# Patient Record
Sex: FEMALE | Race: BLACK OR AFRICAN AMERICAN | NOT HISPANIC OR LATINO | URBAN - METROPOLITAN AREA
[De-identification: names, ages, dates, MRNs, and addresses within clinical notes are randomized per-mention and may not be internally consistent; named-entity substitution may affect disease eponyms.]

---

## 2017-04-12 ENCOUNTER — EMERGENCY (EMERGENCY)
Facility: HOSPITAL | Age: 47
LOS: 1 days | Discharge: HOME | End: 2017-04-12
Attending: EMERGENCY MEDICINE | Admitting: EMERGENCY MEDICINE
Payer: MEDICARE

## 2017-04-12 VITALS
DIASTOLIC BLOOD PRESSURE: 98 MMHG | HEART RATE: 99 BPM | SYSTOLIC BLOOD PRESSURE: 168 MMHG | OXYGEN SATURATION: 99 % | RESPIRATION RATE: 20 BRPM | TEMPERATURE: 99 F

## 2017-04-12 VITALS
RESPIRATION RATE: 18 BRPM | TEMPERATURE: 99 F | HEART RATE: 80 BPM | OXYGEN SATURATION: 98 % | DIASTOLIC BLOOD PRESSURE: 70 MMHG | SYSTOLIC BLOOD PRESSURE: 161 MMHG

## 2017-04-12 DIAGNOSIS — R51 HEADACHE: ICD-10-CM

## 2017-04-12 PROCEDURE — 99282 EMERGENCY DEPT VISIT SF MDM: CPT

## 2017-04-12 PROCEDURE — 99283 EMERGENCY DEPT VISIT LOW MDM: CPT | Mod: GC

## 2017-04-12 NOTE — ED PROVIDER NOTE - MEDICAL DECISION MAKING DETAILS
44 yo F p/w right-sided facial pain  -FS 44 yo F p/w right-sided facial pain  -FS  Attending [place]: 47 y/o female presenting with right temporal headache. no focal neurologic deficits on exam to suggest intracranial mass or lesion. no visual deficits. pt asymtomatic currently and feels back to her baseline. less likely temporal arteritis as pt only 46 and currently asymptomatic and well appearing. no pulsatile mass to suggest temporal artery aneurysm. pt observed in the ED without any complaints. will d/c with neuro follow upw

## 2017-04-12 NOTE — ED PROVIDER NOTE - PHYSICAL EXAMINATION
Attending [place]: Gen: NAD, heent: atrauamtic, eomi, perrla, mmm, op pink, uvula midline, TM:;clear neck; nttp, no nuchal rigidity, chest: nttp, no crepitus, cv: rrr, no murmurs, lungs: ctab, abd: soft, nontender, nondistended, no peritoneal signs, +BS, no guarding, ext: wwp, neg homans, skin: no rash, neuro: awake and alert, following commands, speech clear, sensation and strength intact, no focal deficits

## 2017-04-12 NOTE — ED PROVIDER NOTE - CARE PLAN
Principal Discharge DX:	Facial pain  Goal:	Resolution  Instructions for follow-up, activity and diet:	Please follow-up with the Neurology Clinic within 48hrs [phone]  Please take Tylenol for pain.  Please return to the ED if symptoms acutely worsen. Principal Discharge DX:	Facial pain  Goal:	Resolution  Instructions for follow-up, activity and diet:	Please follow-up with the Neurology Clinic within 48hrs [phone]  Please take Tylenol for pain.  Please return to the ED if symptoms acutely worsen.

## 2017-04-12 NOTE — ED PROVIDER NOTE - OBJECTIVE STATEMENT
47 yo F with T2DM on Metformin, HTN -medication non-compliant, Hyperthyroidism -medication non-compliant p/w 1 day history of right facial pain. The pain began this AM and is described as a sharp flash of pain lateral to the eye that occurs for 3-4seconds and occurs every 30-40 minutes. No vision changes, erythema, nasal discharge, recent URI, hx of sinusitis, tenderness to palpation, nausea, vomiting, or aura symptoms. Denies fevers, chills, chest pain, dyspnea/sob, abdominal pain.    Of note, last menstrual period beginning of March 2017; patient normally has period every 30d, regular, heavy bleeding x5d and is 1 week late. Denies systemic symptoms, hot flashes or possibility of pregnancy. 45 yo F with T2DM on Metformin, HTN -medication non-compliant, Hyperthyroidism -medication non-compliant p/w 1 day history of right facial pain. The pain began this AM and is described as a sharp flash of pain lateral to the eye that occurs for 3-4seconds and occurs every 30-40 minutes. No vision changes, erythema, nasal discharge, recent URI, hx of sinusitis, tenderness to palpation, nausea, vomiting, or aura symptoms. Denies fevers, chills, chest pain, dyspnea/sob, abdominal pain.    Of note, last menstrual period beginning of March 2017; patient normally has period every 30d, regular, heavy bleeding x5d and is 1 week late. Denies systemic symptoms, hot flashes or possibility of pregnancy.  Attending [place]: agree with above. additionally pt states has not been sleeping well, with incerased stress. has chronic numbness in her feet secondary to diabetes. no visual changes, hearing pain. or dental pain. no pain currently

## 2017-04-12 NOTE — ED PROVIDER NOTE - PLAN OF CARE
Resolution Please follow-up with the Neurology Clinic within 48hrs [phone]  Please take Tylenol for pain.  Please return to the ED if symptoms acutely worsen.

## 2017-04-12 NOTE — ED ADULT NURSE NOTE - OBJECTIVE STATEMENT
Pt ambulatory to ED in no apparent distress c/o right facial pain.  Pt AXOX4, gross neuro intact.  Pt well appearing.  Pt calm, cooperative.  NO dizziness, N/V, F/C, bleeding, dark stools, injury/falls.  LMP beginning of March.  Will continue to monitor. Pt ambulatory to ED in no apparent distress c/o right facial pain.  Pt AXOX4, gross neuro intact.  NO unilateral weakness, facial droop, speech changes.  Pt well appearing.  Pt calm, cooperative.  NO dizziness, N/V, F/C, bleeding, dark stools, injury/falls, numbness/tingling, CP, SOB, visual changes.  LMP beginning of March.  Will continue to monitor.
